# Patient Record
Sex: MALE | Race: BLACK OR AFRICAN AMERICAN | NOT HISPANIC OR LATINO | Employment: UNEMPLOYED | URBAN - METROPOLITAN AREA
[De-identification: names, ages, dates, MRNs, and addresses within clinical notes are randomized per-mention and may not be internally consistent; named-entity substitution may affect disease eponyms.]

---

## 2022-10-23 PROBLEM — D72.829 LEUKOCYTOSIS: Status: ACTIVE | Noted: 2022-10-23

## 2022-10-23 PROBLEM — R79.89 ELEVATED LACTIC ACID LEVEL: Status: ACTIVE | Noted: 2022-10-23

## 2022-10-23 PROBLEM — K68.3 RETROPERITONEAL HEMATOMA: Status: ACTIVE | Noted: 2022-10-23

## 2022-10-23 PROBLEM — S22.20XA CLOSED FRACTURE OF STERNUM: Status: ACTIVE | Noted: 2022-10-23
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
## 2022-10-23 PROBLEM — S30.1XXA ABDOMINAL CONTUSION: Status: ACTIVE | Noted: 2022-10-23

## 2022-10-23 PROBLEM — R74.01 TRANSAMINITIS: Status: ACTIVE | Noted: 2022-10-23

## 2022-10-23 PROBLEM — S82.043A: Status: ACTIVE | Noted: 2022-10-23

## 2022-10-23 PROBLEM — S72.401C: Status: ACTIVE | Noted: 2022-10-23

## 2022-10-23 PROBLEM — S36.113A LIVER LACERATION: Status: ACTIVE | Noted: 2022-10-23

## 2022-10-23 PROBLEM — S02.32XA FRACTURE OF LEFT ORBITAL FLOOR: Status: ACTIVE | Noted: 2022-10-23

## 2022-10-23 PROBLEM — S39.81XA TRAUMATIC ABDOMINAL HERNIA: Status: ACTIVE | Noted: 2022-10-23

## 2022-10-23 PROBLEM — S82.101B: Status: ACTIVE | Noted: 2022-10-23

## 2022-10-24 PROBLEM — S82.102B: Status: ACTIVE | Noted: 2022-10-23

## 2022-10-24 PROBLEM — S72.402N: Status: ACTIVE | Noted: 2022-10-23

## 2022-10-26 PROBLEM — S71.102A: Status: ACTIVE | Noted: 2022-10-26

## 2022-10-26 PROBLEM — S72.402C: Status: ACTIVE | Noted: 2022-10-23

## 2022-10-26 PROBLEM — D62 ACUTE BLOOD LOSS ANEMIA: Status: ACTIVE | Noted: 2022-10-26

## 2022-10-26 PROBLEM — S76.902A: Status: ACTIVE | Noted: 2022-10-26

## 2022-10-26 PROBLEM — S83.105A: Status: ACTIVE | Noted: 2022-10-26

## 2022-10-26 PROBLEM — R79.89 ELEVATED LFTS: Status: ACTIVE | Noted: 2022-10-23

## 2022-10-26 PROBLEM — S71.002A: Status: ACTIVE | Noted: 2022-10-26

## 2022-10-26 PROBLEM — S81.002A: Status: ACTIVE | Noted: 2022-10-26

## 2022-10-26 PROBLEM — R79.89 ELEVATED LACTIC ACID LEVEL: Status: RESOLVED | Noted: 2022-10-23 | Resolved: 2022-10-26

## 2022-10-26 PROBLEM — S76.002A: Status: ACTIVE | Noted: 2022-10-26

## 2022-10-29 PROBLEM — S72.402N: Status: ACTIVE | Noted: 2022-10-29

## 2022-10-29 PROBLEM — R00.0 TACHYCARDIA: Status: ACTIVE | Noted: 2022-10-29

## 2022-10-29 PROBLEM — T14.90XA TRAUMA: Status: ACTIVE | Noted: 2022-10-29

## 2022-10-29 PROBLEM — R07.9 CHEST PAIN: Status: ACTIVE | Noted: 2022-10-29

## 2022-11-03 PROBLEM — R00.0 TACHYCARDIA: Status: RESOLVED | Noted: 2022-10-29 | Resolved: 2022-11-03

## 2022-11-05 PROBLEM — S27.899A TRAUMATIC MEDIASTINAL HEMATOMA: Status: ACTIVE | Noted: 2022-11-05

## 2022-11-05 PROBLEM — S01.511A LACERATION OF LOWER LIP: Status: ACTIVE | Noted: 2022-11-05

## 2022-11-05 PROBLEM — E87.1 HYPONATREMIA: Status: ACTIVE | Noted: 2022-11-05

## 2022-11-05 PROBLEM — S22.22XA CLOSED FRACTURE OF BODY OF STERNUM: Status: ACTIVE | Noted: 2022-10-23

## 2022-11-05 PROBLEM — J34.1 MUCOUS RETENTION CYST OF MAXILLARY SINUS: Status: ACTIVE | Noted: 2022-11-05

## 2022-11-05 PROBLEM — I31.39 PERICARDIAL EFFUSION: Status: ACTIVE | Noted: 2022-11-05

## 2022-11-05 PROBLEM — S05.02XA: Status: ACTIVE | Noted: 2022-11-05

## 2022-11-05 PROBLEM — E87.20 LACTIC ACIDOSIS: Status: RESOLVED | Noted: 2022-10-23 | Resolved: 2022-10-26

## 2022-11-05 PROBLEM — R79.89 ELEVATED TROPONIN I LEVEL: Status: ACTIVE | Noted: 2022-11-05

## 2022-11-05 PROBLEM — D69.6 THROMBOCYTOPENIA: Status: ACTIVE | Noted: 2022-11-05

## 2022-11-05 PROBLEM — J34.2 DEVIATED NASAL SEPTUM: Status: ACTIVE | Noted: 2022-11-05

## 2022-11-05 PROBLEM — E87.8 HYPERCHLOREMIA: Status: RESOLVED | Noted: 2022-11-05 | Resolved: 2022-11-05

## 2022-11-05 PROBLEM — S36.899A TRAUMATIC HEMOPERITONEUM: Status: ACTIVE | Noted: 2022-11-05

## 2022-11-05 PROBLEM — S83.522A TEAR OF PCL (POSTERIOR CRUCIATE LIGAMENT) OF KNEE, LEFT, INITIAL ENCOUNTER: Status: ACTIVE | Noted: 2022-11-05

## 2022-11-05 PROBLEM — S80.211A ABRASION OF RIGHT KNEE: Status: ACTIVE | Noted: 2022-11-05

## 2022-11-05 PROBLEM — E87.3 METABOLIC ALKALOSIS: Status: ACTIVE | Noted: 2022-11-05

## 2022-11-05 PROBLEM — E83.39 HYPOPHOSPHATEMIA: Status: RESOLVED | Noted: 2022-11-05 | Resolved: 2022-11-05

## 2022-11-05 PROBLEM — E66.3 OVERWEIGHT (BMI 25.0-29.9): Status: ACTIVE | Noted: 2022-11-05

## 2022-11-05 PROBLEM — E83.39 HYPOPHOSPHATEMIA: Status: ACTIVE | Noted: 2022-11-05

## 2022-11-05 PROBLEM — R40.2432 GLASGOW COMA SCALE SCORE 3-8, AT ARRIVAL TO EMERGENCY DEPARTMENT: Status: RESOLVED | Noted: 2022-11-05 | Resolved: 2022-11-05

## 2022-11-05 PROBLEM — S82.042C: Status: ACTIVE | Noted: 2022-10-23

## 2022-11-05 PROBLEM — Z99.11 VENTILATOR DEPENDENCE: Status: RESOLVED | Noted: 2022-11-05 | Resolved: 2022-11-05

## 2022-11-05 PROBLEM — R40.2432 GLASGOW COMA SCALE SCORE 3-8, AT ARRIVAL TO EMERGENCY DEPARTMENT: Status: ACTIVE | Noted: 2022-11-05

## 2022-11-05 PROBLEM — R60.0 PERIORBITAL EDEMA OF LEFT EYE: Status: ACTIVE | Noted: 2022-11-05

## 2022-11-05 PROBLEM — S83.512A LEFT ACL TEAR: Status: ACTIVE | Noted: 2022-11-05

## 2022-11-05 PROBLEM — S36.119A: Status: ACTIVE | Noted: 2022-10-23

## 2022-11-05 PROBLEM — E88.09 HYPOALBUMINEMIA: Status: ACTIVE | Noted: 2022-11-05

## 2022-11-05 PROBLEM — R73.9 HYPERGLYCEMIA: Status: RESOLVED | Noted: 2022-11-05 | Resolved: 2022-11-05

## 2022-11-05 PROBLEM — S36.892A TRAUMATIC RETROPERITONEAL HEMATOMA: Status: ACTIVE | Noted: 2022-10-23

## 2022-11-05 PROBLEM — E80.6 HYPERBILIRUBINEMIA: Status: ACTIVE | Noted: 2022-11-05

## 2022-11-05 PROBLEM — D75.839 THROMBOCYTOSIS: Status: ACTIVE | Noted: 2022-11-05

## 2022-11-05 PROBLEM — F12.10 MILD TETRAHYDROCANNABINOL (THC) ABUSE: Status: ACTIVE | Noted: 2022-11-05

## 2022-11-05 PROBLEM — R73.9 HYPERGLYCEMIA: Status: ACTIVE | Noted: 2022-11-05

## 2022-11-05 PROBLEM — M25.062 HEMARTHROSIS OF LEFT KNEE: Status: ACTIVE | Noted: 2022-11-05

## 2022-11-05 PROBLEM — E87.8 HYPERCHLOREMIA: Status: ACTIVE | Noted: 2022-11-05

## 2022-11-05 PROBLEM — Z99.11 VENTILATOR DEPENDENCE: Status: ACTIVE | Noted: 2022-11-05

## 2022-11-05 PROBLEM — Z87.81 HISTORY OF CLOSED FRACTURE OF NASAL BONES: Status: ACTIVE | Noted: 2022-11-05

## 2022-11-05 PROBLEM — S70.12XA: Status: ACTIVE | Noted: 2022-11-05

## 2022-11-05 PROBLEM — E83.39 HYPERPHOSPHATEMIA: Status: ACTIVE | Noted: 2022-11-05

## 2022-11-05 PROBLEM — D69.6 THROMBOCYTOPENIA: Status: RESOLVED | Noted: 2022-11-05 | Resolved: 2022-11-05

## 2022-11-07 PROBLEM — S76.902A: Status: RESOLVED | Noted: 2022-10-26 | Resolved: 2022-11-07

## 2022-11-07 PROBLEM — S36.892A TRAUMATIC RETROPERITONEAL HEMATOMA: Status: RESOLVED | Noted: 2022-10-23 | Resolved: 2022-11-07

## 2022-11-07 PROBLEM — R60.0 PERIORBITAL EDEMA OF LEFT EYE: Status: RESOLVED | Noted: 2022-11-05 | Resolved: 2022-11-07

## 2022-11-07 PROBLEM — S70.12XA: Status: RESOLVED | Noted: 2022-11-05 | Resolved: 2022-11-07

## 2022-11-07 PROBLEM — S39.81XA TRAUMATIC ABDOMINAL HERNIA: Status: RESOLVED | Noted: 2022-10-23 | Resolved: 2022-11-07

## 2022-11-07 PROBLEM — S81.002A: Status: RESOLVED | Noted: 2022-10-26 | Resolved: 2022-11-07

## 2022-11-07 PROBLEM — E83.39 HYPERPHOSPHATEMIA: Status: RESOLVED | Noted: 2022-11-05 | Resolved: 2022-11-07

## 2022-11-07 PROBLEM — S05.02XA: Status: RESOLVED | Noted: 2022-11-05 | Resolved: 2022-11-07

## 2022-11-07 PROBLEM — S71.002A: Status: RESOLVED | Noted: 2022-10-26 | Resolved: 2022-11-07

## 2022-11-07 PROBLEM — S01.511A LACERATION OF LOWER LIP: Status: RESOLVED | Noted: 2022-11-05 | Resolved: 2022-11-07

## 2022-11-07 PROBLEM — D72.829 LEUKOCYTOSIS: Status: RESOLVED | Noted: 2022-10-23 | Resolved: 2022-11-07

## 2022-11-07 PROBLEM — I31.39 PERICARDIAL EFFUSION: Status: RESOLVED | Noted: 2022-11-05 | Resolved: 2022-11-07

## 2022-11-07 PROBLEM — S82.042C: Status: RESOLVED | Noted: 2022-10-23 | Resolved: 2022-11-07

## 2022-11-07 PROBLEM — R74.01 TRANSAMINITIS: Status: RESOLVED | Noted: 2022-10-23 | Resolved: 2022-11-07

## 2022-11-07 PROBLEM — S27.899A TRAUMATIC MEDIASTINAL HEMATOMA: Status: RESOLVED | Noted: 2022-11-05 | Resolved: 2022-11-07

## 2022-11-07 PROBLEM — S71.102A: Status: RESOLVED | Noted: 2022-10-26 | Resolved: 2022-11-07

## 2022-11-07 PROBLEM — S22.22XA CLOSED FRACTURE OF BODY OF STERNUM: Status: RESOLVED | Noted: 2022-10-23 | Resolved: 2022-11-07

## 2022-11-07 PROBLEM — S36.119A: Status: RESOLVED | Noted: 2022-10-23 | Resolved: 2022-11-07

## 2022-11-07 PROBLEM — R79.89 ELEVATED TROPONIN I LEVEL: Status: RESOLVED | Noted: 2022-11-05 | Resolved: 2022-11-07

## 2022-11-07 PROBLEM — S83.105A: Status: RESOLVED | Noted: 2022-10-26 | Resolved: 2022-11-07

## 2022-11-07 PROBLEM — E88.09 HYPOALBUMINEMIA: Status: RESOLVED | Noted: 2022-11-05 | Resolved: 2022-11-07

## 2022-11-07 PROBLEM — S76.002A: Status: RESOLVED | Noted: 2022-10-26 | Resolved: 2022-11-07

## 2022-11-07 PROBLEM — S30.1XXA HEMATOMA OF ABDOMINAL WALL: Status: RESOLVED | Noted: 2022-10-23 | Resolved: 2022-11-07

## 2022-11-07 PROBLEM — M25.062 HEMARTHROSIS OF LEFT KNEE: Status: RESOLVED | Noted: 2022-11-05 | Resolved: 2022-11-07

## 2022-11-07 PROBLEM — E87.3 METABOLIC ALKALOSIS: Status: RESOLVED | Noted: 2022-11-05 | Resolved: 2022-11-07

## 2022-11-07 PROBLEM — D62 ACUTE BLOOD LOSS ANEMIA: Status: RESOLVED | Noted: 2022-10-26 | Resolved: 2022-11-07

## 2022-11-07 PROBLEM — S72.402C: Status: RESOLVED | Noted: 2022-10-23 | Resolved: 2022-11-07

## 2022-11-07 PROBLEM — E80.6 HYPERBILIRUBINEMIA: Status: RESOLVED | Noted: 2022-11-05 | Resolved: 2022-11-07

## 2022-11-07 PROBLEM — S02.32XA CLOSED FRACTURE OF LEFT ORBITAL FLOOR: Status: RESOLVED | Noted: 2022-10-23 | Resolved: 2022-11-07

## 2022-11-07 PROBLEM — S82.102B: Status: RESOLVED | Noted: 2022-10-23 | Resolved: 2022-11-07

## 2022-11-07 PROBLEM — E87.1 HYPONATREMIA: Status: RESOLVED | Noted: 2022-11-05 | Resolved: 2022-11-07

## 2022-11-07 PROBLEM — S36.899A TRAUMATIC HEMOPERITONEUM: Status: RESOLVED | Noted: 2022-11-05 | Resolved: 2022-11-07

## 2022-11-07 PROBLEM — S80.211A ABRASION OF RIGHT KNEE: Status: RESOLVED | Noted: 2022-11-05 | Resolved: 2022-11-07

## 2023-01-05 PROBLEM — S72.432D: Status: ACTIVE | Noted: 2023-01-05

## 2023-01-05 PROBLEM — S82.202D CLOSED FRACTURE OF SHAFT OF LEFT TIBIA WITH ROUTINE HEALING: Status: ACTIVE | Noted: 2023-01-05

## 2023-01-05 PROBLEM — G89.18 ACUTE POSTOPERATIVE PAIN: Status: ACTIVE | Noted: 2023-01-05

## 2023-01-05 PROBLEM — S72.422D: Status: ACTIVE | Noted: 2023-01-05

## 2023-03-31 PROBLEM — M24.662 ARTHROFIBROSIS OF KNEE JOINT, LEFT: Status: ACTIVE | Noted: 2023-03-31

## 2023-04-10 PROBLEM — G89.18 ACUTE POSTOPERATIVE PAIN: Status: RESOLVED | Noted: 2023-01-05 | Resolved: 2023-04-10

## 2023-06-29 PROBLEM — F19.10 POLYSUBSTANCE ABUSE: Status: ACTIVE | Noted: 2023-06-29
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
## 2023-06-29 PROBLEM — S82.872A CLOSED DISPLACED PILON FRACTURE OF LEFT TIBIA: Status: ACTIVE | Noted: 2023-06-29

## 2023-06-29 PROBLEM — S12.500A C6 CERVICAL FRACTURE: Status: ACTIVE | Noted: 2023-06-29

## 2023-06-29 PROBLEM — S82.899A ANKLE FRACTURE: Status: ACTIVE | Noted: 2023-06-29

## 2023-07-20 ENCOUNTER — PATIENT OUTREACH (OUTPATIENT)
Dept: ADMINISTRATIVE | Facility: OTHER | Age: 23
End: 2023-07-20

## 2023-07-26 NOTE — PROGRESS NOTES
CHW - Initial Contact    This Community Health Worker completed the Social Determinant of Health questionnaire with patient via telephone today.    Pt identified barriers of most importance are: Continuous Passive Machine CPM for range of motion  Referrals to community agencies completed with patient/caregiver consent outside of [facility] include: DME   Referrals were put through [facility] - N/A  Support and Services:   Other information discussed the patient needs / wants help with: currently in PT so may not need CPM and will follow up next week.     Follow-up Outreach - Due: 8/1/2023

## 2023-08-15 ENCOUNTER — PATIENT OUTREACH (OUTPATIENT)
Dept: ADMINISTRATIVE | Facility: OTHER | Age: 23
End: 2023-08-15

## 2023-08-15 NOTE — PROGRESS NOTES
CHW - Follow Up    This Community Health Worker completed a follow up visit with patient and mom via telephone today.  Pt/Caregiver reported: still need CPM, but also needing to restart therapy after having broken more bones. In 's appt today to see when therapy can start.  Community Health Worker provided: assurance that I would call company for CPM that won't deliver to [facility]. Patient may have to continue using PT for passive ROM. We agreed to a follow up call in a couple of day.     Follow-up Outreach - Due: 8/17/2023

## 2023-08-24 ENCOUNTER — PATIENT OUTREACH (OUTPATIENT)
Dept: ADMINISTRATIVE | Facility: OTHER | Age: 23
End: 2023-08-24

## 2023-08-24 NOTE — PROGRESS NOTES
CHW - Follow Up    This Community Health Worker completed a follow up visit with caregiver via telephone today.  Caregiver reported: patient is having Pt now and hasn't heard from [employer] who will deliver cpm.   Community Health Worker provided: confirmation that I'd find out exactly what's going.  Peter is out of town this week.  He has attempted a few times to deliver the cpm.  I informed caregiver that a referral would be resubmitted and Peter would need a check or money order for $150. Patient expressed understanding.     Follow-up Outreach - Due: 9/1/2023

## 2023-09-07 ENCOUNTER — PATIENT OUTREACH (OUTPATIENT)
Dept: ADMINISTRATIVE | Facility: OTHER | Age: 23
End: 2023-09-07

## 2023-09-07 NOTE — PROGRESS NOTES
CHW - Follow Up    This Community Health Worker completed a follow up visit with caregiver via telephone today.  Caregiver reported: still hasn't heard from Peter or anyone regarding cpm machine. Serge lives in [place] with his grandmother so cpm machine can be set up in [place] with Peter.   Community Health Worker provided: will follow up to get machine with pt.     Follow-up Outreach - Due: 9/11/2023

## 2023-09-12 ENCOUNTER — PATIENT OUTREACH (OUTPATIENT)
Dept: ADMINISTRATIVE | Facility: OTHER | Age: 23
End: 2023-09-12

## 2023-09-12 NOTE — PROGRESS NOTES
CHW - Follow Up and Case Closure    This Community Health Worker completed a follow up visit with caregiver via telephone today.  Caregiver reported: per Dr Cadena CPM is not needed until after Sx in December.  Caregiver denied any additional needs at this time and agrees with episode closure at this time.  Provided caregiver with Community Health Worker's contact information and encouraged her to contact this Community Health Worker if additional needs arise.